# Patient Record
Sex: MALE | Race: OTHER
[De-identification: names, ages, dates, MRNs, and addresses within clinical notes are randomized per-mention and may not be internally consistent; named-entity substitution may affect disease eponyms.]

---

## 2023-02-18 ENCOUNTER — HOSPITAL ENCOUNTER (EMERGENCY)
Dept: HOSPITAL 15 - ER | Age: 68
Discharge: ANOTHER HOSPITAL | End: 2023-02-18
Payer: COMMERCIAL

## 2023-02-18 VITALS — WEIGHT: 141.1 LBS | BODY MASS INDEX: 19.11 KG/M2 | HEIGHT: 72 IN

## 2023-02-18 VITALS — SYSTOLIC BLOOD PRESSURE: 146 MMHG | DIASTOLIC BLOOD PRESSURE: 97 MMHG
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
## 2023-02-18 DIAGNOSIS — Z20.822: ICD-10-CM

## 2023-02-18 DIAGNOSIS — S09.90XA: Primary | ICD-10-CM

## 2023-02-18 DIAGNOSIS — I60.9: ICD-10-CM
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
## 2023-02-18 DIAGNOSIS — I16.0: ICD-10-CM

## 2023-02-18 DIAGNOSIS — I10: ICD-10-CM
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
## 2023-02-18 DIAGNOSIS — E78.5: ICD-10-CM

## 2023-02-18 LAB
ALBUMIN SERPL-MCNC: 3.7 G/DL (ref 3.4–5)
ALP SERPL-CCNC: 86 U/L (ref 45–117)
ALT SERPL-CCNC: 18 U/L (ref 16–61)
ANION GAP SERPL CALCULATED.3IONS-SCNC: 10 MMOL/L (ref 5–15)
APTT PPP: < 20 SEC (ref 24.6–33.4)
BILIRUB SERPL-MCNC: 1.2 MG/DL (ref 0.2–1)
BUN SERPL-MCNC: 30 MG/DL (ref 7–18)
BUN/CREAT SERPL: 26.3
CALCIUM SERPL-MCNC: 9.5 MG/DL (ref 8.5–10.1)
CHLORIDE SERPL-SCNC: 109 MMOL/L (ref 98–107)
CO2 SERPL-SCNC: 25 MMOL/L (ref 21–32)
GLUCOSE SERPL-MCNC: 95 MG/DL (ref 74–106)
HCT VFR BLD AUTO: 42.7 % (ref 41–53)
HGB BLD-MCNC: 14.4 G/DL (ref 13.5–17.5)
INR PPP: 1.01 (ref 0.9–1.15)
MCH RBC QN AUTO: 33 PG (ref 28–32)
MCV RBC AUTO: 98.1 FL (ref 80–100)
NRBC BLD QL AUTO: 0 %
POTASSIUM SERPL-SCNC: 5.4 MMOL/L (ref 3.5–5.1)
PROT SERPL-MCNC: 8.1 G/DL (ref 6.4–8.2)
SODIUM SERPL-SCNC: 144 MMOL/L (ref 136–145)

## 2023-02-18 PROCEDURE — 93005 ELECTROCARDIOGRAM TRACING: CPT

## 2023-02-18 PROCEDURE — 84484 ASSAY OF TROPONIN QUANT: CPT

## 2023-02-18 PROCEDURE — 80053 COMPREHEN METABOLIC PANEL: CPT

## 2023-02-18 PROCEDURE — 70450 CT HEAD/BRAIN W/O DYE: CPT

## 2023-02-18 PROCEDURE — 85610 PROTHROMBIN TIME: CPT

## 2023-02-18 PROCEDURE — 72125 CT NECK SPINE W/O DYE: CPT

## 2023-02-18 PROCEDURE — 85730 THROMBOPLASTIN TIME PARTIAL: CPT

## 2023-02-18 PROCEDURE — 71045 X-RAY EXAM CHEST 1 VIEW: CPT

## 2023-02-18 PROCEDURE — 36415 COLL VENOUS BLD VENIPUNCTURE: CPT

## 2023-02-18 PROCEDURE — 96374 THER/PROPH/DIAG INJ IV PUSH: CPT

## 2023-02-18 PROCEDURE — 99291 CRITICAL CARE FIRST HOUR: CPT

## 2023-02-18 PROCEDURE — 87426 SARSCOV CORONAVIRUS AG IA: CPT

## 2023-02-18 PROCEDURE — 72192 CT PELVIS W/O DYE: CPT

## 2023-02-18 PROCEDURE — 85025 COMPLETE CBC W/AUTO DIFF WBC: CPT

## 2023-09-20 ENCOUNTER — HOSPITAL ENCOUNTER (OUTPATIENT)
Dept: HOSPITAL 15 - CT | Age: 68
Discharge: HOME | End: 2023-09-20
Payer: COMMERCIAL

## 2023-09-20 DIAGNOSIS — R51.9: Primary | ICD-10-CM

## 2023-09-20 PROCEDURE — 70450 CT HEAD/BRAIN W/O DYE: CPT

## 2024-09-09 ENCOUNTER — HOSPITAL ENCOUNTER (OUTPATIENT)
Dept: HOSPITAL 15 - XYW | Age: 69
Discharge: HOME | End: 2024-09-09
Payer: COMMERCIAL

## 2024-09-09 DIAGNOSIS — Z79.899: ICD-10-CM

## 2024-09-09 DIAGNOSIS — I70.8: Primary | ICD-10-CM

## 2024-09-09 LAB
ANION GAP SERPL CALCULATED.3IONS-SCNC: 4 MMOL/L (ref 5–15)
BUN SERPL-MCNC: 18 MG/DL (ref 9–23)
BUN/CREAT SERPL: 17 (ref 10–20)
CALCIUM SERPL-MCNC: 9.9 MG/DL (ref 8.7–10.4)
CHLORIDE SERPL-SCNC: 108 MMOL/L (ref 98–107)
CO2 SERPL-SCNC: 30 MMOL/L (ref 20–30)
GLUCOSE SERPL-MCNC: 80 MG/DL (ref 74–106)
POTASSIUM SERPL-SCNC: 4 MMOL/L (ref 3.5–5.1)
SODIUM SERPL-SCNC: 142 MMOL/L (ref 136–145)

## 2024-09-09 PROCEDURE — 80048 BASIC METABOLIC PNL TOTAL CA: CPT

## 2024-09-09 PROCEDURE — 36415 COLL VENOUS BLD VENIPUNCTURE: CPT

## 2024-09-09 PROCEDURE — 74177 CT ABD & PELVIS W/CONTRAST: CPT

## 2025-02-11 ENCOUNTER — HOSPITAL ENCOUNTER (OUTPATIENT)
Dept: HOSPITAL 15 - CT | Age: 70
Discharge: HOME | End: 2025-02-11
Payer: COMMERCIAL

## 2025-02-11 DIAGNOSIS — G93.89: Primary | ICD-10-CM

## 2025-02-11 DIAGNOSIS — G43.909: ICD-10-CM

## 2025-02-11 PROCEDURE — 70450 CT HEAD/BRAIN W/O DYE: CPT

## 2025-02-11 NOTE — [facility]
EXAM: CT HEAD WITHOUT CONTRAST



INDICATION: RECURRENT MIGRAINE- LIKE HEADACHES



TECHNIQUE:  CT of the head without intravenous contrast.  Coronal and sagittal reformatted images are
 submitted.



Radiation Dose : 



1. Head:        CT Dose: CTDI volume is 57.68 mGy. Dose-length product is 1136.76 mGy*cm



The dose indicators for CT are the volume Computed Tomography (CT) Dose Index (CTDIvol) and the Dose 
Length Product (DLP), and are measured in units of mGy and mGy-cm, respectively. These indicators are
 not patient dose, but values generated from the CT scanner acquisition factors. The report includes 
radiation exposure data for exposures received during this examination.  All CT scans at this medical
 facility are performed using dose modulation techniques as appropriate to a performed exam including
 the following: Automated exposure control was utilized; adjustment of the MA and/or KV according to 
patient size; and use of iterative reconstruction technique.



COMPARISON: CT HEAD WITHOUT CONTRAST on DOS: 9/20/23



FINDINGS: 



There is no evidence of acute intracranial hemorrhage, extra-axial collection, mass effect, midline s
hift, herniation or hydrocephalus.



There are periventricular and subcortical hypodensities, nonspecific, but likely reflecting sequelae 
of chronic microvascular ischemic changes. There is encephalomalacia in the right occipital lobe.



The ventricles, sulci and cisterns are age appropriate.



The gray-white differentiation is intact. 



The visualized paranasal sinuses and mastoid air cells are clear. 



No depressed calvarial fracture. There is an occipital metallic density. The surrounding soft tissues
 are unremarkable.



IMPRESSION: 



1. No evidence of acute intracranial abnormality.



Electronically Signed by: Melany Britt at 02/11/2025 09:21:29 AM